# Patient Record
(demographics unavailable — no encounter records)

---

## 2024-12-06 NOTE — ASSESSMENT
[FreeTextEntry1] : 38-year-old male with a likely rotator cuff of the right shoulder    -Discussed diagnosis, natural history of condition, and treatment options -Need an MRI to evaluate his rotator cuff to assess if he is a surgical candidate or not -In the meantime he will attempt physical therapy which will help if he needs surgery or if he does not -He will obtain the MRI and I will call him with the results -Surgery would be in the form of right shoulder arthroscopic rotator cuff repair.  Risk benefits alternatives of surgery were discussed with the patient -Plan pending MRI

## 2024-12-06 NOTE — HISTORY OF PRESENT ILLNESS
[Right] : right hand dominant [FreeTextEntry1] : year of right shoulder pain.  by trade, does a lot of heavy lifting. Treated for a kidney tumor by Dr. Garcia. No specific trauma. Pain when sleeping on side. Worse with overhead activities. No treatment.  Has significant weakness to the right shoulder.

## 2024-12-06 NOTE — PHYSICAL EXAM
[de-identified] : Physical Exam   Gen: NAD Resp: Nonlabored Cards: WWP   Right Shoulder   Skin intact, no lesions Normal deltoid contour   TTP around GT/rotator cuff insertion  NTTP AC joint, biceps, coracoid, posterior scapula  No pain with neck ROM (full ROM)  FF 80 ER arm at side 70 IR to pocket No limitations in passive ROM positive drop arm test Negative Speeds Supraspinatus impingement signs + Neers + Keith + weak supraspinatus testing WWP No numbness [de-identified] :  3 views of the right shoulder were obtained and reviewed in clinic showing no fractures or dislocations, preserved joint spaces, no lesions, and no soft tissue abnormalities

## 2025-01-23 NOTE — HISTORY OF PRESENT ILLNESS
[de-identified] : Mr.Sebastian Altamirano is a 32 year old male who presents to the office for a renal mass. Patient was having pain in his abdominal area on 10/12/2021  and went to the ER and had a CT of the abdomen and pelvis. The results showed that the patient had diverticulitis but a renal mass was seen. A follow up renal ultrasound revealed on the right kidney a 3.7cm cystic, solid mass within the upper pole measuring 3.7 x 3.1x 3.5 cm. Suspicious for malignancy.  Patient has a hx of lipoma, according to the patient, one the size of his fist was removed in March 2018. \par  Patient started to lose weight within the last year, about 23 lbs were loss due to a diet change and was previous on dietary supplement 2019. Started the dietary supplements in 2016 with intermittent use over 1 month.  \par  Patient denies sleep apnea issues, use of steroids or testosterone gels/supplements.\par  \par   [de-identified] : Patient seen and examined for routine follow up. He had diverticulitis fall 2024. Following with Dr. Garcia states last time he saw him was 6/2024 and had MRI A/P 7/2024 but did not have CT chest. denies urinary issues hematuria. Following with Dr. Galan.

## 2025-01-23 NOTE — REVIEW OF SYSTEMS
[Fatigue] : fatigue [Recent Change In Weight] : ~T recent weight change [Wheezing] : wheezing [Cough] : cough [Constipation] : constipation [Diarrhea: Grade 1 - Increase of <4 stools per day over baseline; mild increase in ostomy output compared to baseline] : Diarrhea: Grade 1 - Increase of <4 stools per day over baseline; mild increase in ostomy output compared to baseline [Joint Pain] : joint pain [Insomnia] : insomnia [Depression] : depression [Negative] : Allergic/Immunologic [FreeTextEntry2] : states has gained 20lbs in the last month [FreeTextEntry9] : left and right knee; has been noticing numbness when holding objects for the last year [FreeTextEntry7] : diverticulitis flares

## 2025-01-23 NOTE — PHYSICAL EXAM
[Fully active, able to carry on all pre-disease performance without restriction] : Status 0 - Fully active, able to carry on all pre-disease performance without restriction [Obese] : obese [Normal] : affect appropriate [de-identified] : leg brace left leg

## 2025-01-23 NOTE — ASSESSMENT
[FreeTextEntry1] : #R sided renal mass - 3.8 cm - ddx reviewed - concerning for RCC - CT Chest subcentimeter nodule - will monitor - Genetics - BMPR1A VUS - s/p right nephrectomy 11/22/2021: renal cell carcinoma, clear cell, confirmed to kidney, negative margins, negative lymph nodes - Reviewed guidelines MRI A/P nad CT chest annually and H and P annually - Follow up with Dr. Garcia  - s/p MRI Abd 6/2024 ordered by Dr. Garcia Status post right nephrectomy. No evidence of recurrent or metastatic disease in the abdomen. Next due 6/2025 - He did not have CT chest or US abd (see chart notes)  - Recently hospitalized for diverticulitis - imaging reviewed  - Reminded to get CT chest as this was not done inpatient either   #leukocytosis - resolved  #diverticulitis - Another flare 11/2024 s/p hospitalization  - Need to follow up   #dark stools  - Had [facility] 11/2022 - s/p EGD - no bleed - following with Dr. 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 pending ferritin b12 and folate pending  #tobacco use - encourage cessation   follow up in 6 months with cbc with diff, cmp with  MRI a/p

## 2025-01-23 NOTE — REASON FOR VISIT
[Follow-Up Visit] : a follow-up [Home] : at home, [unfilled] , at the time of the visit. [Medical Office: ([facility])___] : at the medical office located in  [This encounter was initiated by telehealth (audio with video) and converted to telephone (audio only) due to technical difficulties.] : This encounter was initiated by telehealth (audio with video) and converted to telephone (audio only) due to technical difficulties. [FreeTextEntry2] : renal mass

## 2025-01-23 NOTE — HISTORY OF PRESENT ILLNESS
[de-identified] : Mr.Sebastian Altamirano is a 32 year old male who presents to the office for a renal mass. Patient was having pain in his abdominal area on 10/12/2021  and went to the ER and had a CT of the abdomen and pelvis. The results showed that the patient had diverticulitis but a renal mass was seen. A follow up renal ultrasound revealed on the right kidney a 3.7cm cystic, solid mass within the upper pole measuring 3.7 x 3.1x 3.5 cm. Suspicious for malignancy.  Patient has a hx of lipoma, according to the patient, one the size of his fist was removed in March 2018. \par  Patient started to lose weight within the last year, about 23 lbs were loss due to a diet change and was previous on dietary supplement 2019. Started the dietary supplements in 2016 with intermittent use over 1 month.  \par  Patient denies sleep apnea issues, use of steroids or testosterone gels/supplements.\par  \par   [de-identified] : Patient seen and examined for routine follow up. He had diverticulitis fall 2024. Following with Dr. Garcia states last time he saw him was 6/2024 and had MRI A/P 7/2024 but did not have CT chest. denies urinary issues hematuria. Following with Dr. Galan.

## 2025-01-23 NOTE — REASON FOR VISIT
[Follow-Up Visit] : a follow-up [Home] : at home, [unfilled] , at the time of the visit. [Medical Office: ([facility])___] : at the medical office located in  [This encounter was initiated by telehealth (audio with video) and converted to telephone (audio only) due to technical difficulties.] : This encounter was initiated by telehealth (audio with video) and converted to telephone (audio only) due to technical difficulties. [FreeTextEntry2] : renal mass

## 2025-01-23 NOTE — PHYSICAL EXAM
[Fully active, able to carry on all pre-disease performance without restriction] : Status 0 - Fully active, able to carry on all pre-disease performance without restriction [Obese] : obese [Normal] : affect appropriate [de-identified] : leg brace left leg

## 2025-04-07 NOTE — HISTORY OF PRESENT ILLNESS
[FreeTextEntry1] : Mr. Altamirano is a 38M h/o RCC s/p resection who returns for f/u. Last seen 11/21/24 shortly after being hospitalized for diverticulitis. Hospitalized at [place] from 11/16 - 11/19 for recurrent acute uncomplicated diverticulitis. Second episodes, first was 10/21. Subsequent to that episode, underwent a colonoscopy with myself 11/22 showing only diverticulosis in the sigmoid colon.  When seen last, was still having significant pain in his lower abd/LLQ. Has since completely resolved. No pain. Normal brown BM daily. No weight change. States he has had "dark" stool although not overtly black. No visible blood in his stool.  Does not smoke or drink. No FHx of any GI malignancies.

## 2025-04-07 NOTE — ASSESSMENT
[FreeTextEntry1] : Second episode of diverticulitis, resolved. Would not plan on surgical referral at this time. Can likely hold off on a repeat colonoscopy at this time, although if his symptoms change at all would plan on a colonoscopy. Discussed the vital importance of losing weight, will continue to work on dietary changes, exercise.